# Patient Record
(demographics unavailable — no encounter records)

---

## 2019-12-13 NOTE — RAD
RADIOGRAPH BILATERAL SACROILIAC JOINTS THREE VIEWS:

12/13/19

 

HISTORY: 

35-year-old female with sacroiliitis.

 

FINDINGS: 

Bilateral Essure linear devices are present in the pelvis. Bilateral SI joint spaces are maintained. 
No evidence of erosions or large osteophytes. Minimal sclerosis. No ankylosis. 

 

There is fusion between dysplastic right transverse process of the lowest lumbar vertebra, with the r
ight sacral ala. Limited visualization of bilateral hip joints demonstrate no obvious pathology.

 

IMPRESSION: 

1.      No compelling evidence of sacroiliitis. 

2.      Lumbosacral transitional vertebra, type IIIa. 

3.      Bilateral fallopian tube closure devices. 

 

POS: [facility]